# Patient Record
Sex: MALE | Race: BLACK OR AFRICAN AMERICAN | NOT HISPANIC OR LATINO | Employment: FULL TIME | URBAN - METROPOLITAN AREA
[De-identification: names, ages, dates, MRNs, and addresses within clinical notes are randomized per-mention and may not be internally consistent; named-entity substitution may affect disease eponyms.]

---

## 2020-02-16 ENCOUNTER — HOSPITAL ENCOUNTER (EMERGENCY)
Facility: HOSPITAL | Age: 55
Discharge: HOME | End: 2020-02-16
Attending: EMERGENCY MEDICINE | Admitting: EMERGENCY MEDICINE

## 2020-02-16 VITALS
HEIGHT: 71 IN | DIASTOLIC BLOOD PRESSURE: 106 MMHG | TEMPERATURE: 98.2 F | HEART RATE: 98 BPM | SYSTOLIC BLOOD PRESSURE: 147 MMHG | RESPIRATION RATE: 18 BRPM | OXYGEN SATURATION: 99 %

## 2020-02-16 DIAGNOSIS — F41.1 ANXIETY, GENERALIZED: ICD-10-CM

## 2020-02-16 DIAGNOSIS — F41.0 ANXIETY ATTACK: Primary | ICD-10-CM

## 2020-02-16 LAB
ALBUMIN SERPL-MCNC: 4.7 G/DL (ref 3.5–5.2)
ALBUMIN/GLOB SERPL: 1.6 G/DL
ALP SERPL-CCNC: 61 U/L (ref 39–117)
ALT SERPL W P-5'-P-CCNC: 12 U/L (ref 1–41)
AMPHET+METHAMPHET UR QL: NEGATIVE
ANION GAP SERPL CALCULATED.3IONS-SCNC: 17 MMOL/L (ref 5–15)
AST SERPL-CCNC: 17 U/L (ref 1–40)
BARBITURATES UR QL SCN: NEGATIVE
BENZODIAZ UR QL SCN: NEGATIVE
BILIRUB SERPL-MCNC: 0.5 MG/DL (ref 0.2–1.2)
BUN BLD-MCNC: 8 MG/DL (ref 6–20)
BUN/CREAT SERPL: 6.2 (ref 7–25)
CALCIUM SPEC-SCNC: 9.8 MG/DL (ref 8.6–10.5)
CANNABINOIDS SERPL QL: NEGATIVE
CHLORIDE SERPL-SCNC: 100 MMOL/L (ref 98–107)
CO2 SERPL-SCNC: 23 MMOL/L (ref 22–29)
COCAINE UR QL: NEGATIVE
CREAT BLD-MCNC: 1.3 MG/DL (ref 0.76–1.27)
DEPRECATED RDW RBC AUTO: 35.8 FL (ref 37–54)
ERYTHROCYTE [DISTWIDTH] IN BLOOD BY AUTOMATED COUNT: 13.5 % (ref 12.3–15.4)
ETHANOL BLD-MCNC: <10 MG/DL (ref 0–10)
ETHANOL UR QL: <0.01 %
GFR SERPL CREATININE-BSD FRML MDRD: 70 ML/MIN/1.73
GLOBULIN UR ELPH-MCNC: 3 GM/DL
GLUCOSE BLD-MCNC: 106 MG/DL (ref 65–99)
HCT VFR BLD AUTO: 43.8 % (ref 37.5–51)
HGB BLD-MCNC: 13.5 G/DL (ref 13–17.7)
HYPOCHROMIA BLD QL: ABNORMAL
LYMPHOCYTES # BLD MANUAL: 4.33 10*3/MM3 (ref 0.7–3.1)
LYMPHOCYTES NFR BLD MANUAL: 44 % (ref 19.6–45.3)
LYMPHOCYTES NFR BLD MANUAL: 5 % (ref 5–12)
MAGNESIUM SERPL-MCNC: 1.8 MG/DL (ref 1.6–2.6)
MCH RBC QN AUTO: 22.7 PG (ref 26.6–33)
MCHC RBC AUTO-ENTMCNC: 30.8 G/DL (ref 31.5–35.7)
MCV RBC AUTO: 73.7 FL (ref 79–97)
METHADONE UR QL SCN: NEGATIVE
MICROCYTES BLD QL: ABNORMAL
MONOCYTES # BLD AUTO: 0.49 10*3/MM3 (ref 0.1–0.9)
NEUTROPHILS # BLD AUTO: 4.82 10*3/MM3 (ref 1.7–7)
NEUTROPHILS NFR BLD MANUAL: 49 % (ref 42.7–76)
OPIATES UR QL: NEGATIVE
OXYCODONE UR QL SCN: POSITIVE
PLAT MORPH BLD: NORMAL
PLATELET # BLD AUTO: 415 10*3/MM3 (ref 140–450)
PMV BLD AUTO: 10 FL (ref 6–12)
POTASSIUM BLD-SCNC: 3.3 MMOL/L (ref 3.5–5.2)
PROT SERPL-MCNC: 7.7 G/DL (ref 6–8.5)
RBC # BLD AUTO: 5.94 10*6/MM3 (ref 4.14–5.8)
SODIUM BLD-SCNC: 140 MMOL/L (ref 136–145)
VARIANT LYMPHS NFR BLD MANUAL: 2 % (ref 0–5)
WBC MORPH BLD: NORMAL
WBC NRBC COR # BLD: 9.84 10*3/MM3 (ref 3.4–10.8)

## 2020-02-16 PROCEDURE — 80307 DRUG TEST PRSMV CHEM ANLYZR: CPT | Performed by: EMERGENCY MEDICINE

## 2020-02-16 PROCEDURE — 99284 EMERGENCY DEPT VISIT MOD MDM: CPT

## 2020-02-16 PROCEDURE — 93010 ELECTROCARDIOGRAM REPORT: CPT | Performed by: INTERNAL MEDICINE

## 2020-02-16 PROCEDURE — 90791 PSYCH DIAGNOSTIC EVALUATION: CPT

## 2020-02-16 PROCEDURE — 83735 ASSAY OF MAGNESIUM: CPT | Performed by: EMERGENCY MEDICINE

## 2020-02-16 PROCEDURE — 85025 COMPLETE CBC W/AUTO DIFF WBC: CPT | Performed by: EMERGENCY MEDICINE

## 2020-02-16 PROCEDURE — 80053 COMPREHEN METABOLIC PANEL: CPT | Performed by: EMERGENCY MEDICINE

## 2020-02-16 PROCEDURE — 93005 ELECTROCARDIOGRAM TRACING: CPT | Performed by: EMERGENCY MEDICINE

## 2020-02-16 PROCEDURE — 85007 BL SMEAR W/DIFF WBC COUNT: CPT | Performed by: EMERGENCY MEDICINE

## 2020-02-16 RX ORDER — HYDROXYZINE HYDROCHLORIDE 25 MG/1
25 TABLET, FILM COATED ORAL 3 TIMES DAILY PRN
Qty: 15 TABLET | Refills: 0 | Status: SHIPPED | OUTPATIENT
Start: 2020-02-16

## 2020-02-16 RX ORDER — LORAZEPAM 1 MG/1
1 TABLET ORAL ONCE
Status: COMPLETED | OUTPATIENT
Start: 2020-02-16 | End: 2020-02-16

## 2020-02-16 RX ADMIN — LORAZEPAM 1 MG: 1 TABLET ORAL at 01:43

## 2020-02-16 NOTE — ED NOTES
Pt here for c/o anxiety and heart racing.  Per ems, they made runs on patient twice today, and on the second run, encouraged the patient to come to the hospital.  Pt states he has pain from a broken jaw, excedrin wasn't helping with his pain, so he took a hydrocodone earlier this evening.  Pt states with earlier ems run, his mother came over and he felt better.  Pt appears very anxious.      Nano Jimenez RN  02/16/20 0132
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
## 2020-02-16 NOTE — ED PROVIDER NOTES
EMERGENCY DEPARTMENT ENCOUNTER    CHIEF COMPLAINT  Chief Complaint: Anxiety  History given by: Patient  History limited by: none  Room Number: 26/26  PMD: Provider, No Known      HPI:  Pt is a 54 y.o. male who presents complaining of anxiety that occurred today while at home. He reports that sxs began around 0000 shortly after taking Norco for his mandible fx. EMS also checked on pt earlier today, but he did not seek care at that time. He describes the anxiety as a racing sensation. He also reports having some palpitations and SOA with anxiety. He has been nauseated.     PAST MEDICAL HISTORY  Active Ambulatory Problems     Diagnosis Date Noted   • No Active Ambulatory Problems     Resolved Ambulatory Problems     Diagnosis Date Noted   • No Resolved Ambulatory Problems     No Additional Past Medical History       PAST SURGICAL HISTORY  No past surgical history on file.    FAMILY HISTORY  No family history on file.    SOCIAL HISTORY  Social History     Socioeconomic History   • Marital status:      Spouse name: Not on file   • Number of children: Not on file   • Years of education: Not on file   • Highest education level: Not on file       ALLERGIES  Patient has no known allergies.    REVIEW OF SYSTEMS  Review of Systems   Constitutional: Negative for activity change, appetite change and fever.   HENT: Negative for congestion and sore throat.         Jaw pain   Eyes: Negative.    Respiratory: Positive for shortness of breath. Negative for cough.    Cardiovascular: Positive for chest pain. Negative for leg swelling.   Gastrointestinal: Positive for nausea. Negative for abdominal pain, diarrhea and vomiting.   Endocrine: Negative.    Genitourinary: Negative for decreased urine volume and dysuria.   Musculoskeletal: Negative for neck pain.   Skin: Negative for rash and wound.   Allergic/Immunologic: Negative.    Neurological: Negative for weakness, numbness and headaches.   Hematological: Negative.     Psychiatric/Behavioral: The patient is nervous/anxious.    All other systems reviewed and are negative.      PHYSICAL EXAM  ED Triage Vitals [02/16/20 0041]   Temp Heart Rate Resp BP SpO2   97.3 °F (36.3 °C) 110 24 146/98 99 %      Temp src Heart Rate Source Patient Position BP Location FiO2 (%)   Tympanic -- -- -- --       Physical Exam   Constitutional: He is oriented to person, place, and time. No distress.   HENT:   Head: Normocephalic and atraumatic.   Eyes: Pupils are equal, round, and reactive to light. EOM are normal.   Neck: Normal range of motion. Neck supple.   Cardiovascular: Normal rate, regular rhythm and normal heart sounds.   Pulmonary/Chest: Effort normal and breath sounds normal. No respiratory distress.   Abdominal: Soft. There is no tenderness. There is no rebound and no guarding.   Musculoskeletal: Normal range of motion. He exhibits no edema.   Neurological: He is alert and oriented to person, place, and time. He has normal sensation and normal strength.   Skin: Skin is warm and dry.   Psychiatric: Mood and affect normal. His mood appears anxious (mild).   Denies suicidal ideation   Nursing note and vitals reviewed.      LAB RESULTS  Lab Results (last 24 hours)     Procedure Component Value Units Date/Time    CBC & Differential [[id number]] Collected:  02/16/20 0111    Specimen:  Blood Updated:  02/16/20 0129    Narrative:       The following orders were created for panel order CBC & Differential.  Procedure                               Abnormality         Status                     ---------                               -----------         ------                     CBC Auto Differential[[id number]]        Abnormal            Final result                 Please view results for these tests on the individual orders.    Comprehensive Metabolic Panel [[id number]]  (Abnormal) Collected:  02/16/20 0111    Specimen:  Blood Updated:  02/16/20 0140     Glucose 106 mg/dL      BUN 8 mg/dL       Creatinine 1.30 mg/dL      Sodium 140 mmol/L      Potassium 3.3 mmol/L      Chloride 100 mmol/L      CO2 23.0 mmol/L      Calcium 9.8 mg/dL      Total Protein 7.7 g/dL      Albumin 4.70 g/dL      ALT (SGPT) 12 U/L      AST (SGOT) 17 U/L      Alkaline Phosphatase 61 U/L      Total Bilirubin 0.5 mg/dL      eGFR  African Amer 70 mL/min/1.73      Globulin 3.0 gm/dL      A/G Ratio 1.6 g/dL      BUN/Creatinine Ratio 6.2     Anion Gap 17.0 mmol/L     Narrative:       GFR Normal >60  Chronic Kidney Disease <60  Kidney Failure <15      Magnesium [[id number]]  (Normal) Collected:  02/16/20 0111    Specimen:  Blood Updated:  02/16/20 0140     Magnesium 1.8 mg/dL     CBC Auto Differential [[id number]]  (Abnormal) Collected:  02/16/20 0111    Specimen:  Blood Updated:  02/16/20 0129     WBC 9.84 10*3/mm3      RBC 5.94 10*6/mm3      Hemoglobin 13.5 g/dL      Hematocrit 43.8 %      MCV 73.7 fL      MCH 22.7 pg      MCHC 30.8 g/dL      RDW 13.5 %      RDW-SD 35.8 fl      MPV 10.0 fL      Platelets 415 10*3/mm3     Urine Drug Screen - Urine, Clean Catch [[id number]]  (Abnormal) Collected:  02/16/20 0111    Specimen:  Urine, Clean Catch Updated:  02/16/20 0146     Amphet/Methamphet, Screen Negative     Barbiturates Screen, Urine Negative     Benzodiazepine Screen, Urine Negative     Cocaine Screen, Urine Negative     Opiate Screen Negative     THC, Screen, Urine Negative     Methadone Screen, Urine Negative     Oxycodone Screen, Urine Positive    Narrative:       Negative Thresholds For Drugs Screened:     Amphetamines               500 ng/ml   Barbiturates               200 ng/ml   Benzodiazepines            100 ng/ml   Cocaine                    300 ng/ml   Methadone                  300 ng/ml   Opiates                    300 ng/ml   Oxycodone                  100 ng/ml   THC                        50 ng/ml    The Normal Value for all drugs tested is negative. This report includes final unconfirmed screening results to be used for  medical treatment purposes only. Unconfirmed results must not be used for non-medical purposes such as employment or legal testing. Clinical consideration should be applied to any drug of abuse test, particulary when unconfirmed results are used.    Ethanol [[id number]] Collected:  02/16/20 0111    Specimen:  Blood Updated:  02/16/20 0140     Ethanol <10 mg/dL      Ethanol % <0.010 %     Manual Differential [[id number]]  (Abnormal) Collected:  02/16/20 0111    Specimen:  Blood Updated:  02/16/20 0153     Neutrophil % 49.0 %      Lymphocyte % 44.0 %      Monocyte % 5.0 %      Atypical Lymphocyte % 2.0 %      Neutrophils Absolute 4.82 10*3/mm3      Lymphocytes Absolute 4.33 10*3/mm3      Monocytes Absolute 0.49 10*3/mm3      Hypochromia Slight/1+     Microcytes Slight/1+     WBC Morphology Normal     Platelet Morphology Normal          I ordered the above labs and reviewed the results      PROCEDURES  Procedures      PROGRESS AND CONSULTS  ED Course as of Feb 16 0219   Sun Feb 16, 2020 0108 EKG          EKG time: 1:07 AM  Rhythm/Rate: Sinus tachycardia rate 109  P waves and LA: Normal  QRS, axis: Normal  ST and T waves: Nonspecific T wave changes    Interpreted Contemporaneously by me, independently viewed  No prior available for comparison       [WH]   0158 1.1 two wks ago   Creatinine(!): 1.30 [WH]   0207 Patient is still having some intermittent anxiety.  His friend reports that the patient has been under a lot of stress recently and is working a lot.  Patient is agreeable to access evaluation.  His vital signs are normal.    [WH]   0217 Patient's care was turned over to Dr. Lindsay.  Access evaluation is pending.    []      ED Course User Index  [] Keshawn Baker MD     12:58 AM  Ordered blood work, EKG, Ativan.       MEDICAL DECISION MAKING  Results were reviewed/discussed with the patient and they were also made aware of online access. Pt also made aware that some labs, such as cultures, will not be  resulted during ER visit and follow up with PMD is necessary.     MDM  Number of Diagnoses or Management Options  Anxiety attack:   Anxiety, generalized:   Diagnosis management comments: Patient presented the ER complaining of anxiety and palpitations.  Patient did appear quite anxious and was tearful at times.  He denied suicidal ideation.  EKG showed mild tachycardia.  Labs were unremarkable except for slightly elevated creatinine.  Patient's care was turned over to Dr. Lindsay, pending access evaluation.       Amount and/or Complexity of Data Reviewed  Clinical lab tests: ordered and reviewed  Tests in the medicine section of CPT®: reviewed and ordered (EKG - See ED course  )  Decide to obtain previous medical records or to obtain history from someone other than the patient: yes  Review and summarize past medical records: yes (No previous records)           DIAGNOSIS  Final diagnoses:   Anxiety attack   Anxiety, generalized       DISPOSITION  Pending    Patient discharged in stable condition.    Reviewed implications of results, diagnosis, meds, responsibility to follow up, warning signs and symptoms of possible worsening, potential complications and reasons to return to ER.  Patient/Family voiced understanding of above instructions.    Discussed plan for discharge, as there is no emergent indication for admission. Patient referred to primary care provider for BP management due to today's BP. Pt/family is agreeable and understands need for follow up and repeat testing.  Pt is aware that discharge does not mean that nothing is wrong but it indicates no emergency is present that requires admission and they must continue care with follow-up as given below or physician of their choice.     FOLLOW-UP  PATIENT LIAISON [facility] [id number] [phone]             Medication List      No changes were made to your prescriptions during this visit.           Latest Documented Vital Signs:  As of 2:19  AM  BP- 146/99 HR- 110 Temp- 97.3 °F (36.3 °C) (Tympanic) O2 sat- 99%    --  Documentation assistance provided by gaudencio Aceves for Dr Baker.  Information recorded by the scribe was done at my direction and has been verified and validated by me.     Arnol Aceves  02/16/20 0150       Keshawn Baker MD  02/16/20 0219

## 2020-02-16 NOTE — ED PROVIDER NOTES
EMERGENCY DEPARTMENT ENCOUNTER    Room number:  26/26  Date Seen:  2/16/2020  Time of transfer: 0217  PCP:  Provider, No Known    HPI  Pt is a 54 y.o. male who presents complaining of anxiety that occurred today while at home.    PHYSICAL EXAM  Pt is awake, alert, no acute distress.      LABORATORY RESULTS:  Recent Results (from the past 24 hour(s))   Comprehensive Metabolic Panel    Collection Time: 02/16/20  1:11 AM   Result Value Ref Range    Glucose 106 (H) 65 - 99 mg/dL    BUN 8 6 - 20 mg/dL    Creatinine 1.30 (H) 0.76 - 1.27 mg/dL    Sodium 140 136 - 145 mmol/L    Potassium 3.3 (L) 3.5 - 5.2 mmol/L    Chloride 100 98 - 107 mmol/L    CO2 23.0 22.0 - 29.0 mmol/L    Calcium 9.8 8.6 - 10.5 mg/dL    Total Protein 7.7 6.0 - 8.5 g/dL    Albumin 4.70 3.50 - 5.20 g/dL    ALT (SGPT) 12 1 - 41 U/L    AST (SGOT) 17 1 - 40 U/L    Alkaline Phosphatase 61 39 - 117 U/L    Total Bilirubin 0.5 0.2 - 1.2 mg/dL    eGFR  African Amer 70 >60 mL/min/1.73    Globulin 3.0 gm/dL    A/G Ratio 1.6 g/dL    BUN/Creatinine Ratio 6.2 (L) 7.0 - 25.0    Anion Gap 17.0 (H) 5.0 - 15.0 mmol/L   Magnesium    Collection Time: 02/16/20  1:11 AM   Result Value Ref Range    Magnesium 1.8 1.6 - 2.6 mg/dL   CBC Auto Differential    Collection Time: 02/16/20  1:11 AM   Result Value Ref Range    WBC 9.84 3.40 - 10.80 10*3/mm3    RBC 5.94 (H) 4.14 - 5.80 10*6/mm3    Hemoglobin 13.5 13.0 - 17.7 g/dL    Hematocrit 43.8 37.5 - 51.0 %    MCV 73.7 (L) 79.0 - 97.0 fL    MCH 22.7 (L) 26.6 - 33.0 pg    MCHC 30.8 (L) 31.5 - 35.7 g/dL    RDW 13.5 12.3 - 15.4 %    RDW-SD 35.8 (L) 37.0 - 54.0 fl    MPV 10.0 6.0 - 12.0 fL    Platelets 415 140 - 450 10*3/mm3   Urine Drug Screen - Urine, Clean Catch    Collection Time: 02/16/20  1:11 AM   Result Value Ref Range    Amphet/Methamphet, Screen Negative Negative    Barbiturates Screen, Urine Negative Negative    Benzodiazepine Screen, Urine Negative Negative    Cocaine Screen, Urine Negative Negative    Opiate Screen Negative  Negative    THC, Screen, Urine Negative Negative    Methadone Screen, Urine Negative Negative    Oxycodone Screen, Urine Positive (A) Negative   Ethanol    Collection Time: 02/16/20  1:11 AM   Result Value Ref Range    Ethanol <10 0 - 10 mg/dL    Ethanol % <0.010 %   Manual Differential    Collection Time: 02/16/20  1:11 AM   Result Value Ref Range    Neutrophil % 49.0 42.7 - 76.0 %    Lymphocyte % 44.0 19.6 - 45.3 %    Monocyte % 5.0 5.0 - 12.0 %    Atypical Lymphocyte % 2.0 0.0 - 5.0 %    Neutrophils Absolute 4.82 1.70 - 7.00 10*3/mm3    Lymphocytes Absolute 4.33 (H) 0.70 - 3.10 10*3/mm3    Monocytes Absolute 0.49 0.10 - 0.90 10*3/mm3    Hypochromia Slight/1+ None Seen    Microcytes Slight/1+ None Seen    WBC Morphology Normal Normal    Platelet Morphology Normal Normal     I reviewed the above results.     RADIOLOGY:  No orders to display     I reviewed the above results    MEDICATIONS ORDERED IN THE ED:  Medications   LORazepam (ATIVAN) tablet 1 mg (1 mg Oral Given 2/16/20 0143)       PROGRESS AND CONSULT NOTES:  0217:  Patient care transferred from Dr. Noman Baker MD for pending ACCESS evaluation.    0350: After bedside evaluation, ACCESS cleared the pt for discharge.    0353: Examined pt. Pt is resting comfortably. Pt has no concerns or complaints at this time. Pt denies SI or HI. Notified pt of ACCESS recommendation. Discussed the plan to discharge the pt home with prescriptions for Atarax. I instructed the pt to follow up with a psychiatrist for further evaluation and management. Strict RTER instructions are given to the pt. Pt understands and agrees with the plan, all questions answered.    DIAGNOSIS:  Final diagnoses:   Anxiety attack   Anxiety, generalized       DISPOSITION:  DISCHARGE    Patient discharged in stable condition.    Reviewed implications of results, diagnosis, meds, responsibility to follow up, warning signs and symptoms of possible worsening, potential complications and reasons to  return to ER.    Patient/Family voiced understanding of above instructions.    Discussed plan for discharge, as there is no emergent indication for admission. Patient referred to primary care provider for BP management due to today's BP. Pt/family is agreeable and understands need for follow up and repeat testing.  Pt is aware that discharge does not mean that nothing is wrong but it indicates no emergency is present that requires admission and they must continue care with follow-up as given below or physician of their choice.     FOLLOW-UP  PATIENT LIAISON [place] [id number]  [phone]             Medication List      New Prescriptions    hydrOXYzine 25 MG tablet  Commonly known as:  ATARAX  Take 1 tablet by mouth 3 (Three) Times a Day As Needed for Anxiety.              Provider attestation:  I personally reviewed the past medical history, past surgical history, social history, family history, current medications, and allergies as they appear in the chart.    Documentation assistance provided by gaudencio Harris for Dr. Sukumar Lindsay MD.  Information recorded by the scribe was done at my direction and has been verified and validated by me.        Michelle Harris  02/16/20 0406       Sukumar Lindsay MD  02/16/20 0233